# Patient Record
Sex: FEMALE | Race: WHITE | Employment: FULL TIME | URBAN - METROPOLITAN AREA
[De-identification: names, ages, dates, MRNs, and addresses within clinical notes are randomized per-mention and may not be internally consistent; named-entity substitution may affect disease eponyms.]

---

## 2019-04-12 PROBLEM — Z34.00 SUPERVISION OF NORMAL FIRST PREGNANCY: Status: ACTIVE | Noted: 2019-04-12

## 2019-04-12 PROCEDURE — 87389 HIV-1 AG W/HIV-1&-2 AB AG IA: CPT | Performed by: OBSTETRICS & GYNECOLOGY

## 2019-04-12 PROCEDURE — 86850 RBC ANTIBODY SCREEN: CPT | Performed by: OBSTETRICS & GYNECOLOGY

## 2019-04-12 PROCEDURE — 86901 BLOOD TYPING SEROLOGIC RH(D): CPT | Performed by: OBSTETRICS & GYNECOLOGY

## 2019-04-12 PROCEDURE — 86900 BLOOD TYPING SEROLOGIC ABO: CPT | Performed by: OBSTETRICS & GYNECOLOGY

## 2019-04-12 PROCEDURE — 87086 URINE CULTURE/COLONY COUNT: CPT | Performed by: OBSTETRICS & GYNECOLOGY

## 2019-07-15 ENCOUNTER — OFFICE VISIT (OUTPATIENT)
Dept: PERINATAL CARE | Facility: HOSPITAL | Age: 29
End: 2019-07-15
Attending: OBSTETRICS & GYNECOLOGY
Payer: COMMERCIAL

## 2019-07-15 VITALS
BODY MASS INDEX: 31.41 KG/M2 | WEIGHT: 184 LBS | HEART RATE: 80 BPM | HEIGHT: 64 IN | DIASTOLIC BLOOD PRESSURE: 86 MMHG | SYSTOLIC BLOOD PRESSURE: 122 MMHG

## 2019-07-15 DIAGNOSIS — IMO0001 CHOROID PLEXUS CYST OF FETUS, SINGLE OR UNSPECIFIED FETUS: ICD-10-CM

## 2019-07-15 PROCEDURE — 99243 OFF/OP CNSLTJ NEW/EST LOW 30: CPT | Performed by: OBSTETRICS & GYNECOLOGY

## 2019-07-15 PROCEDURE — 76811 OB US DETAILED SNGL FETUS: CPT | Performed by: OBSTETRICS & GYNECOLOGY

## 2019-07-15 NOTE — PROGRESS NOTES
History: Age: 33 years.  : 1 Para: 0.  ____________________________________________________________________________  Dating:  LMP: 2019 EDC: 2019 GA by LMP:   Current Scan on: 07/15/2019 EDC: 2019 GA by current scan:   T mm.  ____________________________________________________________________________  Comments:    Dear Dr. Luke Brock,       Thank you for requesting  an ultrasound and consultation for   Nichelle Junior  regarding choroid plexus cyst. Her prenatal records and of structural abnormalities are seen   4.  choroid plexus cyst- resolved    Recommendations:    1.  continue routine care             Thank you for allowing me to participate in the care of your patient.   Please do not hesitate to call with any questions o

## 2019-09-11 PROBLEM — O24.419 GESTATIONAL DIABETES: Status: ACTIVE | Noted: 2019-09-11

## 2019-10-30 PROBLEM — O99.820 GROUP B STREPTOCOCCUS CARRIER, +RV CULTURE, CURRENTLY PREGNANT: Status: ACTIVE | Noted: 2019-10-30

## 2019-11-08 ENCOUNTER — HOSPITAL ENCOUNTER (OUTPATIENT)
Facility: HOSPITAL | Age: 29
Setting detail: OBSERVATION
Discharge: HOME | End: 2019-11-08
Attending: OBSTETRICS & GYNECOLOGY | Admitting: OBSTETRICS & GYNECOLOGY
Payer: COMMERCIAL

## 2019-11-08 VITALS
SYSTOLIC BLOOD PRESSURE: 121 MMHG | BODY MASS INDEX: 33.46 KG/M2 | OXYGEN SATURATION: 96 % | HEIGHT: 64 IN | DIASTOLIC BLOOD PRESSURE: 78 MMHG | RESPIRATION RATE: 17 BRPM | TEMPERATURE: 98 F | WEIGHT: 196 LBS | HEART RATE: 73 BPM

## 2019-11-08 PROBLEM — Z34.90 PREGNANCY: Status: ACTIVE | Noted: 2019-11-08

## 2019-11-08 PROCEDURE — 84156 ASSAY OF PROTEIN URINE: CPT | Performed by: OBSTETRICS & GYNECOLOGY

## 2019-11-08 PROCEDURE — 99213 OFFICE O/P EST LOW 20 MIN: CPT

## 2019-11-08 PROCEDURE — 80053 COMPREHEN METABOLIC PANEL: CPT | Performed by: OBSTETRICS & GYNECOLOGY

## 2019-11-08 PROCEDURE — 85025 COMPLETE CBC W/AUTO DIFF WBC: CPT | Performed by: OBSTETRICS & GYNECOLOGY

## 2019-11-08 PROCEDURE — 82570 ASSAY OF URINE CREATININE: CPT | Performed by: OBSTETRICS & GYNECOLOGY

## 2019-11-08 PROCEDURE — 84550 ASSAY OF BLOOD/URIC ACID: CPT | Performed by: OBSTETRICS & GYNECOLOGY

## 2019-11-08 PROCEDURE — 36415 COLL VENOUS BLD VENIPUNCTURE: CPT

## 2019-11-08 PROCEDURE — 59025 FETAL NON-STRESS TEST: CPT

## 2019-11-08 NOTE — NST
Nonstress Test   Patient: Carmen Molina    Gestation: 38w1d    NST:       Variability: Moderate           Accelerations: Yes           Decelerations: None            Baseline: 135 BPM           Uterine Irritability: Yes           Contractions: Not pres

## 2019-11-08 NOTE — PROGRESS NOTES
Patient is a  @38.1 weeks sent from office for [facility] evaluation. Patient taken to triage room 3, labs completed, admission assessments done, monitoring initiated.

## 2019-11-08 NOTE — PROGRESS NOTES
Patient discharged home with appointment to follow up on Augusto 11/10 for follow up labs and blood pressure check. Patient left ambulatory with spouse in stable condition.

## 2019-11-08 NOTE — PROGRESS NOTES
d Hospital  Labor and Delivery Prenatal History and Physical Interval Addendum/Triage assessment  Please see full Prenatal Record for this pregnancy      SUBJECTIVE:    Interval History:      This is a pregnancy at 38 weeks who presents to Labor and Deliver

## 2019-11-10 ENCOUNTER — APPOINTMENT (OUTPATIENT)
Dept: OBGYN CLINIC | Facility: HOSPITAL | Age: 29
End: 2019-11-10
Payer: COMMERCIAL

## 2019-11-10 ENCOUNTER — HOSPITAL ENCOUNTER (INPATIENT)
Facility: HOSPITAL | Age: 29
LOS: 3 days | Discharge: HOME | End: 2019-11-13
Attending: OBSTETRICS & GYNECOLOGY | Admitting: OBSTETRICS & GYNECOLOGY
Payer: COMMERCIAL

## 2019-11-10 ENCOUNTER — ANESTHESIA (OUTPATIENT)
Dept: OBGYN CLINIC | Facility: HOSPITAL | Age: 29
End: 2019-11-10
Payer: COMMERCIAL

## 2019-11-10 PROCEDURE — 81002 URINALYSIS NONAUTO W/O SCOPE: CPT

## 2019-11-10 PROCEDURE — 86901 BLOOD TYPING SEROLOGIC RH(D): CPT | Performed by: OBSTETRICS & GYNECOLOGY

## 2019-11-10 PROCEDURE — 85025 COMPLETE CBC W/AUTO DIFF WBC: CPT | Performed by: OBSTETRICS & GYNECOLOGY

## 2019-11-10 PROCEDURE — 86900 BLOOD TYPING SEROLOGIC ABO: CPT | Performed by: OBSTETRICS & GYNECOLOGY

## 2019-11-10 PROCEDURE — 80053 COMPREHEN METABOLIC PANEL: CPT | Performed by: OBSTETRICS & GYNECOLOGY

## 2019-11-10 PROCEDURE — 86850 RBC ANTIBODY SCREEN: CPT | Performed by: OBSTETRICS & GYNECOLOGY

## 2019-11-10 PROCEDURE — 99214 OFFICE O/P EST MOD 30 MIN: CPT

## 2019-11-10 PROCEDURE — 84550 ASSAY OF BLOOD/URIC ACID: CPT | Performed by: OBSTETRICS & GYNECOLOGY

## 2019-11-10 PROCEDURE — 86780 TREPONEMA PALLIDUM: CPT | Performed by: OBSTETRICS & GYNECOLOGY

## 2019-11-10 RX ORDER — ZOLPIDEM TARTRATE 5 MG/1
5 TABLET ORAL NIGHTLY PRN
Status: DISCONTINUED | OUTPATIENT
Start: 2019-11-10 | End: 2019-11-11 | Stop reason: HOSPADM

## 2019-11-10 RX ORDER — IBUPROFEN 600 MG/1
600 TABLET ORAL ONCE AS NEEDED
Status: DISCONTINUED | OUTPATIENT
Start: 2019-11-10 | End: 2019-11-11 | Stop reason: HOSPADM

## 2019-11-10 RX ORDER — TERBUTALINE SULFATE 1 MG/ML
0.25 INJECTION, SOLUTION SUBCUTANEOUS AS NEEDED
Status: DISCONTINUED | OUTPATIENT
Start: 2019-11-10 | End: 2019-11-11 | Stop reason: HOSPADM

## 2019-11-10 RX ORDER — TRISODIUM CITRATE DIHYDRATE AND CITRIC ACID MONOHYDRATE 500; 334 MG/5ML; MG/5ML
30 SOLUTION ORAL AS NEEDED
Status: DISCONTINUED | OUTPATIENT
Start: 2019-11-10 | End: 2019-11-11 | Stop reason: HOSPADM

## 2019-11-10 RX ORDER — DEXTROSE, SODIUM CHLORIDE, SODIUM LACTATE, POTASSIUM CHLORIDE, AND CALCIUM CHLORIDE 5; .6; .31; .03; .02 G/100ML; G/100ML; G/100ML; G/100ML; G/100ML
INJECTION, SOLUTION INTRAVENOUS AS NEEDED
Status: DISCONTINUED | OUTPATIENT
Start: 2019-11-10 | End: 2019-11-11 | Stop reason: HOSPADM

## 2019-11-10 RX ORDER — SODIUM CHLORIDE, SODIUM LACTATE, POTASSIUM CHLORIDE, CALCIUM CHLORIDE 600; 310; 30; 20 MG/100ML; MG/100ML; MG/100ML; MG/100ML
INJECTION, SOLUTION INTRAVENOUS CONTINUOUS
Status: DISCONTINUED | OUTPATIENT
Start: 2019-11-10 | End: 2019-11-11 | Stop reason: HOSPADM

## 2019-11-10 NOTE — PROGRESS NOTES
Pt is a 26year old female admitted to TRG2/TRG2-A, Patient presents with:   Assessment: Scheduled for NST, blood pressure checks and PIH labwork     Pt is 38w3d intra-uterine pregnancy. Denies any leaking of fluid. Reports +fetal movement.    Hist

## 2019-11-11 ENCOUNTER — ANESTHESIA EVENT (OUTPATIENT)
Dept: OBGYN CLINIC | Facility: HOSPITAL | Age: 29
End: 2019-11-11
Payer: COMMERCIAL

## 2019-11-11 PROCEDURE — 82962 GLUCOSE BLOOD TEST: CPT

## 2019-11-11 PROCEDURE — 3E033VJ INTRODUCTION OF OTHER HORMONE INTO PERIPHERAL VEIN, PERCUTANEOUS APPROACH: ICD-10-PCS | Performed by: OBSTETRICS & GYNECOLOGY

## 2019-11-11 PROCEDURE — 0KQM0ZZ REPAIR PERINEUM MUSCLE, OPEN APPROACH: ICD-10-PCS | Performed by: OBSTETRICS & GYNECOLOGY

## 2019-11-11 PROCEDURE — 3E0P7VZ INTRODUCTION OF HORMONE INTO FEMALE REPRODUCTIVE, VIA NATURAL OR ARTIFICIAL OPENING: ICD-10-PCS | Performed by: OBSTETRICS & GYNECOLOGY

## 2019-11-11 RX ORDER — EPHEDRINE SULFATE/0.9% NACL/PF 25 MG/5 ML
5 SYRINGE (ML) INTRAVENOUS AS NEEDED
Status: DISCONTINUED | OUTPATIENT
Start: 2019-11-11 | End: 2019-11-13

## 2019-11-11 RX ORDER — IBUPROFEN 600 MG/1
600 TABLET ORAL EVERY 6 HOURS
Status: DISCONTINUED | OUTPATIENT
Start: 2019-11-11 | End: 2019-11-13

## 2019-11-11 RX ORDER — SIMETHICONE 80 MG
80 TABLET,CHEWABLE ORAL 3 TIMES DAILY PRN
Status: DISCONTINUED | OUTPATIENT
Start: 2019-11-11 | End: 2019-11-13

## 2019-11-11 RX ORDER — BISACODYL 10 MG
10 SUPPOSITORY, RECTAL RECTAL ONCE AS NEEDED
Status: DISCONTINUED | OUTPATIENT
Start: 2019-11-11 | End: 2019-11-13

## 2019-11-11 RX ORDER — NALBUPHINE HCL 10 MG/ML
2.5 AMPUL (ML) INJECTION
Status: DISCONTINUED | OUTPATIENT
Start: 2019-11-11 | End: 2019-11-13

## 2019-11-11 RX ORDER — DOCUSATE SODIUM 100 MG/1
100 CAPSULE, LIQUID FILLED ORAL
Status: DISCONTINUED | OUTPATIENT
Start: 2019-11-11 | End: 2019-11-13

## 2019-11-11 RX ORDER — FAMOTIDINE 20 MG/1
20 TABLET ORAL 2 TIMES DAILY
Status: DISCONTINUED | OUTPATIENT
Start: 2019-11-11 | End: 2019-11-13

## 2019-11-11 RX ORDER — LIDOCAINE HYDROCHLORIDE AND EPINEPHRINE 15; 5 MG/ML; UG/ML
INJECTION, SOLUTION EPIDURAL AS NEEDED
OUTPATIENT
Start: 2019-11-11

## 2019-11-11 RX ORDER — FAMOTIDINE 20 MG/1
20 TABLET ORAL 2 TIMES DAILY
Status: DISCONTINUED | OUTPATIENT
Start: 2019-11-11 | End: 2019-11-11

## 2019-11-11 RX ORDER — ACETAMINOPHEN 325 MG/1
650 TABLET ORAL EVERY 6 HOURS PRN
Status: DISCONTINUED | OUTPATIENT
Start: 2019-11-11 | End: 2019-11-13

## 2019-11-11 RX ADMIN — LIDOCAINE HYDROCHLORIDE AND EPINEPHRINE 2 ML: 15; 5 INJECTION, SOLUTION EPIDURAL at 06:07:00

## 2019-11-11 RX ADMIN — LIDOCAINE HYDROCHLORIDE AND EPINEPHRINE 3 ML: 15; 5 INJECTION, SOLUTION EPIDURAL at 06:06:00

## 2019-11-11 NOTE — PLAN OF CARE
Problem: PAIN - ADULT  Goal: Verbalizes/displays adequate comfort level or patient's stated pain goal  Description  INTERVENTIONS:  - Encourage pt to monitor pain and request assistance  - Assess pain using appropriate pain scale  - Administer analgesics provide assistance as needed. - Assess and monitor emotional status and provide social service/psych resources as needed. - Utilize standard precautions and use personal protective equipment as indicated.  Ensure aseptic care of all intravenous lines and bonding  Description  INTERVENTIONS:  - Assess caregiver- interactions. - Assess caregiver's emotional status and coping mechanisms. - Encourage caregiver to participate in  daily care. - Assess support systems available to mother/family.

## 2019-11-11 NOTE — L&D DELIVERY NOTE
Sd Phillips, Girl Fredis Ramírez [[id number]]    Labor Events     labor?:  No   steroids?:  None  Cervical ripening date/time:  11/10/2019 1900  Cervical ripening type:  Cervidil  Antibiotics received during labor?:  Yes  Antibiotics (enter # doses i pathology     Apgars    Living status:  Living   Apgar Scoring Key:     0 1 2    Skin color Blue or pale Acrocyanotic Completely pink    Heart rate Absent <100 bpm >100 bpm    Reflex irritability No response Grimace Cry or active withdrawal    Muscle tone There was delivery of the anterior shoulder and the remainder of the body without complication. The infant was placed on the mothers chest. The cord was clamped and cut by the FOB after 30 + second delay. There was spontaneous delivery of an intact 3V cord

## 2019-11-11 NOTE — PROGRESS NOTES
[facility]      Labor Progress Note    Jayro Carpenter Patient Status:  Inpatient    1990 MRN [id number]   Location [address] [facility] Attending Eller Collet, MD   Hosp Day # 1 PCP No primary care provider on file.       Elmer Roche

## 2019-11-11 NOTE — ANESTHESIA PREPROCEDURE EVALUATION
PRE-OP EVALUATION    Patient Name: Lilia Senior    Pre-op Diagnosis: IUP with labor pain    Labor epidural    Pre-op vitals reviewed. Temp: 97.8 °F (36.6 °C)  Pulse: 99  Resp: 16  BP: 104/77     Body mass index is 30.88 kg/m².     Current medication Patient has no known allergies. Anesthesia Evaluation    Patient summary reviewed. Anesthetic Complications  (-) history of anesthetic complications         GI/Hepatic/Renal    Negative GI/hepatic/renal ROS.                              Julieth Valentine

## 2019-11-11 NOTE — PLAN OF CARE
Problem: Patient/Family Goals  Goal: Patient/Family Long Term Goal  Description  Patient's Long Term Goal: Uncomplicated vaginal delivery    Interventions:  -   - See additional Care Plan goals for specific interventions   Outcome: Progressing  Goal: Flora Rushing Assess pt frequently for physical needs  - Identify cognitive and physical deficits and behaviors that affect risk of falls.   - [place] fall precautions as indicated by assessment.  - Educate pt/family on patient safety including physical limitations  -

## 2019-11-11 NOTE — H&P
[address] Patient Status:  Inpatient    1990 MRN [id number]   Location [facility] Attending Mikala Bernal MD   Hosp Day # 1 PCP No primary care provider on file.      SUBJECTIVE: at Unknown time  Glucose Blood (ONETOUCH VERIO) In Vitro Strip, Check BG 4 times daily, Disp: 150 strip, Rfl: 0, 11/8/2019 at Unknown time  [facility] LANCETS C Does not apply Misc, Check BG 4 times daily, Disp: 150 each, Rfl: 6, 11/8/2019 at Unknow Absolute 11/10/2019 0.05    • Basophil Absolute 11/10/2019 0.02    • Immature Granulocyte Abs* 11/10/2019 0.03    • Neutrophil % 11/10/2019 62.4    • Lymphocyte % 11/10/2019 29.0    • Monocyte % 11/10/2019 7.0    • Eosinophil % 11/10/2019 0.8    • Basophil

## 2019-11-11 NOTE — PLAN OF CARE
Problem: Patient/Family Goals  Goal: Patient/Family Long Term Goal  Description  Patient's Long Term Goal: Uncomplicated vaginal delivery    Interventions:  -   - See additional Care Plan goals for specific interventions   Outcome: Progressing  Goal: Nolan Simeon

## 2019-11-11 NOTE — PROGRESS NOTES
NURSING ADMISSION NOTE      Patient admitted via Wheelchair  Oriented to room. Safety precautions initiated. Bed in low position. Call light in reach. Report received from [address]. ID bands checked by 2 RN's.

## 2019-11-11 NOTE — ANESTHESIA PROCEDURE NOTES
Labor Analgesia  Performed by: Lydia Rivas MD  Authorized by: Lydia Rivas MD       General Information and Staff    Start Time:  11/11/2019 5:55 AM  End Time:  11/11/2019 6:06 AM  Anesthesiologist:  Lydia Rivas MD  Performed by:   Edna

## 2019-11-11 NOTE — PROGRESS NOTES
Patient up to bathroom with assist x 2. Voided 150. Patient transferred to mother/baby room 1108 per wheelchair in stable condition with baby and personal belongings. Accompanied by significant other and staff. Report given to [facility].

## 2019-11-12 PROCEDURE — 85025 COMPLETE CBC W/AUTO DIFF WBC: CPT | Performed by: OBSTETRICS & GYNECOLOGY

## 2019-11-12 NOTE — PAYOR COMM NOTE
--------------  ADMISSION REVIEW     Payor: [employer]  Subscriber #:  [id number]  Authorization Number: [id number]    Admit date: 11/10/19  Admit time: 80       Admitting Physician: Mic Do MD  Attending Physician:  Mic Do MD  Primary Car • Gestational diabetes     diet controlled     Past Social History:   Past Surgical History:   Procedure Laterality Date   • OTHER      Jaw realignment surgery      Family History: No family history on file.   Social History: Social History    Tobacco Use • AST 11/10/2019 24    • ALT 11/10/2019 28    • Alkaline Phosphatase 11/10/2019 128*   • Bilirubin, Total 11/10/2019 0.3    • Total Protein 11/10/2019 6.2*   • Albumin 11/10/2019 2.5*   • Globulin  11/10/2019 3.7    • A/G Ratio 11/10/2019 0.7*   • FASTING Induction of labor with cervidil followed by pitocin per protocol            Electronically signed by Yanci Alfred MD on 11/11/2019  9:52 AM       Hiren Khan MD   Physician   OB/Gyn   L&D Delivery Note   Signed   Date of Service:  11/11/201 # of loops:  1  Timed cord clamping:  Yes  Time in sec:  30  Cord blood disposition:  to lab  Gases sent?:  No      Resuscitation    Method:  PPV                     Measurements           Weight:  3030 g 6 lb 10.9 oz Length:  49.5 cm    Head circum Estimated Blood Loss: 400  Condition: stable     Patient with borderline BP at 38 weeks, elevated Uric acid, she was brought in for IOL. She received Cdil and Pitocin, progressed to complete and pushed for 45 min.  Multiple doses Abx for GBS     Comments: Will await urge to push      Tiffanie Bravo  11/11/2019  9:45 AM              11/12  Karime Thomason MD   Physician   OB/Gyn   Progress Notes   Signed   Date of Service:  11/12/2019  8:35 AM               Signed          Postpartum Day 1     Pt

## 2019-11-12 NOTE — PLAN OF CARE
Problem: PAIN - ADULT  Goal: Verbalizes/displays adequate comfort level or patient's stated pain goal  Description  INTERVENTIONS:  - Encourage pt to monitor pain and request assistance  - Assess pain using appropriate pain scale  - Administer analgesics cultural beliefs/practices regarding lactation, letdown techniques, maternal food preferences.   - Assess mother's knowledge and previous experience with breast feeding.  - Provide information as needed about early infant feeding cues (e.g., rooting, lip sm safety including physical limitations  - Instruct pt to call for assistance with activity based on assessment  - Modify environment to reduce risk of injury  - Provide assistive devices as appropriate  - Consider OT/PT consult to assist with strengthening/

## 2019-11-12 NOTE — PROGRESS NOTES
Postpartum Day 1    Pt without complaints.     Temp: 98.1 °F (36.7 °C)  Pulse: 66  Resp: 18  BP: 129/81  abd  soft, NT, ND, fundus firm below umbilicus  perineum NL lochia  extr  trace edema, no calf tenderness  Recent Labs   Lab 11/12/19  0653   RBC 3.17*

## 2019-11-12 NOTE — PROGRESS NOTES
Labor Analgesia Follow Up Note    Patient underwent epidural anesthesia for labor analgesia,    Placenta Date/Time: 11/11/2019 12:24 PM    Delivery Date/Time[de-identified] 11/11/2019  12:21 PM    /82 (BP Location: Left arm)   Pulse 66   Temp 97.6 °F (36.4 °C) (O

## 2019-11-13 VITALS
HEART RATE: 66 BPM | TEMPERATURE: 98 F | RESPIRATION RATE: 18 BRPM | DIASTOLIC BLOOD PRESSURE: 89 MMHG | WEIGHT: 179.88 LBS | BODY MASS INDEX: 31 KG/M2 | SYSTOLIC BLOOD PRESSURE: 135 MMHG

## 2019-11-13 PROCEDURE — 90471 IMMUNIZATION ADMIN: CPT

## 2019-11-13 NOTE — PROGRESS NOTES
[facility]  Post-Partum Vaginal Delivery Progress Note    Tabatha Solid Patient Status:  Inpatient    1990 MRN [id number]   [facility] 1SW-J Attending Art Landin MD   Hosp Day # 3 PCP No primary care provider on file.

## 2019-11-14 PROBLEM — Z34.00 SUPERVISION OF NORMAL FIRST PREGNANCY: Status: RESOLVED | Noted: 2019-04-12 | Resolved: 2019-11-11

## 2019-11-14 PROBLEM — O99.820 GROUP B STREPTOCOCCUS CARRIER, +RV CULTURE, CURRENTLY PREGNANT: Status: RESOLVED | Noted: 2019-10-30 | Resolved: 2019-11-11

## 2019-11-15 ENCOUNTER — TELEPHONE (OUTPATIENT)
Dept: OBGYN UNIT | Facility: HOSPITAL | Age: 29
End: 2019-11-15

## 2019-11-15 NOTE — PROGRESS NOTES
Reviewed self and infant care w / mom, she verbalizes understanding of instructions reviewed. Encourage to follow up w/ MDs as directed and w/ questions/concerns. PIH reviewed, enc to go to ct and br help.

## 2019-11-18 ENCOUNTER — NURSE ONLY (OUTPATIENT)
Dept: LACTATION | Facility: HOSPITAL | Age: 29
End: 2019-11-18
Attending: OBSTETRICS & GYNECOLOGY
Payer: COMMERCIAL

## 2019-11-18 PROCEDURE — 99213 OFFICE O/P EST LOW 20 MIN: CPT

## 2019-11-19 NOTE — PROGRESS NOTES
LACTATION NOTE - MOTHER      Evaluation Type: Outpatient Initial(lactation visit for latch assistance, infant gaining weight with bottle feeding EBM/formula)    Problems identified  Problems identified: Milk supply not WNL; Knowledge deficit; Unable to achei

## 2019-11-21 ENCOUNTER — NURSE ONLY (OUTPATIENT)
Dept: LACTATION | Facility: HOSPITAL | Age: 29
End: 2019-11-21
Attending: OBSTETRICS & GYNECOLOGY
Payer: COMMERCIAL

## 2019-11-21 PROCEDURE — 99212 OFFICE O/P EST SF 10 MIN: CPT

## 2019-11-21 NOTE — PROGRESS NOTES
LACTATION NOTE - MOTHER      Evaluation Type: Outpatient Follow Up    Problems identified  Problems identified: Knowledge deficit;Milk supply not WNL; Unable to acheive sustained latch  Milk supply not WNL: Hypogalactia  Problems Identified Other: EBM used

## 2019-12-05 ENCOUNTER — NURSE ONLY (OUTPATIENT)
Dept: LACTATION | Facility: HOSPITAL | Age: 29
End: 2019-12-05
Attending: OBSTETRICS & GYNECOLOGY
Payer: COMMERCIAL

## 2019-12-05 PROCEDURE — 99212 OFFICE O/P EST SF 10 MIN: CPT

## 2019-12-05 NOTE — PROGRESS NOTES
LACTATION NOTE - MOTHER      Evaluation Type: Outpatient Follow Up    Problems identified  Problems identified: Knowledge deficit;Milk supply WNL  Milk supply not WNL: Reduced (potential)  Problems Identified Other: EBM used during daytime feeding, formula

## 2020-03-19 ENCOUNTER — OCC HEALTH (OUTPATIENT)
Dept: OCCUPATIONAL MEDICINE | Age: 30
End: 2020-03-19
Attending: FAMILY MEDICINE

## 2020-08-28 PROBLEM — Z34.90 PREGNANCY: Status: RESOLVED | Noted: 2019-11-08 | Resolved: 2020-08-28

## 2020-09-22 PROBLEM — Z34.81 PRENATAL CARE, SUBSEQUENT PREGNANCY, FIRST TRIMESTER: Status: ACTIVE | Noted: 2020-09-22

## 2020-09-22 PROBLEM — Z86.32 HISTORY OF GESTATIONAL DIABETES: Status: ACTIVE | Noted: 2020-09-22

## 2020-09-22 PROBLEM — O24.419 GESTATIONAL DIABETES: Status: RESOLVED | Noted: 2019-09-11 | Resolved: 2020-09-22

## 2020-11-20 PROBLEM — Z34.80 SUPERVISION OF OTHER NORMAL PREGNANCY, ANTEPARTUM: Status: ACTIVE | Noted: 2020-11-20

## 2021-02-16 PROBLEM — O09.299 HX OF PREECLAMPSIA, PRIOR PREGNANCY, CURRENTLY PREGNANT: Status: ACTIVE | Noted: 2021-02-16

## 2021-03-18 PROBLEM — O99.820 GBS (GROUP B STREPTOCOCCUS CARRIER), +RV CULTURE, CURRENTLY PREGNANT: Status: ACTIVE | Noted: 2021-03-18

## 2021-04-05 PROBLEM — Z34.81 PRENATAL CARE, SUBSEQUENT PREGNANCY, FIRST TRIMESTER: Status: RESOLVED | Noted: 2020-09-22 | Resolved: 2021-04-05

## 2021-04-10 ENCOUNTER — HOSPITAL ENCOUNTER (INPATIENT)
Facility: HOSPITAL | Age: 31
LOS: 3 days | Discharge: HOME | End: 2021-04-13
Attending: OBSTETRICS & GYNECOLOGY | Admitting: OBSTETRICS & GYNECOLOGY
Payer: COMMERCIAL

## 2021-04-10 PROBLEM — Z34.90 PREGNANCY: Status: ACTIVE | Noted: 2021-04-10

## 2021-04-10 PROCEDURE — 99214 OFFICE O/P EST MOD 30 MIN: CPT

## 2021-04-10 PROCEDURE — 86780 TREPONEMA PALLIDUM: CPT | Performed by: OBSTETRICS & GYNECOLOGY

## 2021-04-10 PROCEDURE — 86850 RBC ANTIBODY SCREEN: CPT | Performed by: OBSTETRICS & GYNECOLOGY

## 2021-04-10 PROCEDURE — 85025 COMPLETE CBC W/AUTO DIFF WBC: CPT | Performed by: OBSTETRICS & GYNECOLOGY

## 2021-04-10 PROCEDURE — 86901 BLOOD TYPING SEROLOGIC RH(D): CPT | Performed by: OBSTETRICS & GYNECOLOGY

## 2021-04-10 PROCEDURE — 3E033VJ INTRODUCTION OF OTHER HORMONE INTO PERIPHERAL VEIN, PERCUTANEOUS APPROACH: ICD-10-PCS | Performed by: OBSTETRICS & GYNECOLOGY

## 2021-04-10 PROCEDURE — 86900 BLOOD TYPING SEROLOGIC ABO: CPT | Performed by: OBSTETRICS & GYNECOLOGY

## 2021-04-10 RX ORDER — CALCIUM CARBONATE 200(500)MG
500 TABLET,CHEWABLE ORAL 3 TIMES DAILY PRN
Status: DISCONTINUED | OUTPATIENT
Start: 2021-04-10 | End: 2021-04-11

## 2021-04-10 RX ORDER — TRISODIUM CITRATE DIHYDRATE AND CITRIC ACID MONOHYDRATE 500; 334 MG/5ML; MG/5ML
30 SOLUTION ORAL AS NEEDED
Status: DISCONTINUED | OUTPATIENT
Start: 2021-04-10 | End: 2021-04-11 | Stop reason: HOSPADM

## 2021-04-10 RX ORDER — BUPIVACAINE HCL/0.9 % NACL/PF 0.25 %
5 PLASTIC BAG, INJECTION (ML) EPIDURAL AS NEEDED
Status: DISCONTINUED | OUTPATIENT
Start: 2021-04-10 | End: 2021-04-11

## 2021-04-10 RX ORDER — ONDANSETRON 2 MG/ML
4 INJECTION INTRAMUSCULAR; INTRAVENOUS EVERY 6 HOURS PRN
Status: DISCONTINUED | OUTPATIENT
Start: 2021-04-10 | End: 2021-04-11 | Stop reason: HOSPADM

## 2021-04-10 RX ORDER — TERBUTALINE SULFATE 1 MG/ML
0.25 INJECTION, SOLUTION SUBCUTANEOUS AS NEEDED
Status: DISCONTINUED | OUTPATIENT
Start: 2021-04-10 | End: 2021-04-11 | Stop reason: HOSPADM

## 2021-04-10 RX ORDER — IBUPROFEN 600 MG/1
600 TABLET ORAL EVERY 6 HOURS PRN
Status: DISCONTINUED | OUTPATIENT
Start: 2021-04-10 | End: 2021-04-11 | Stop reason: HOSPADM

## 2021-04-10 RX ORDER — AMMONIA INHALANTS 0.04 G/.3ML
0.3 INHALANT RESPIRATORY (INHALATION) AS NEEDED
Status: DISCONTINUED | OUTPATIENT
Start: 2021-04-10 | End: 2021-04-11 | Stop reason: HOSPADM

## 2021-04-10 RX ORDER — DEXTROSE, SODIUM CHLORIDE, SODIUM LACTATE, POTASSIUM CHLORIDE, AND CALCIUM CHLORIDE 5; .6; .31; .03; .02 G/100ML; G/100ML; G/100ML; G/100ML; G/100ML
INJECTION, SOLUTION INTRAVENOUS AS NEEDED
Status: DISCONTINUED | OUTPATIENT
Start: 2021-04-10 | End: 2021-04-11 | Stop reason: HOSPADM

## 2021-04-10 RX ORDER — SODIUM CHLORIDE, SODIUM LACTATE, POTASSIUM CHLORIDE, CALCIUM CHLORIDE 600; 310; 30; 20 MG/100ML; MG/100ML; MG/100ML; MG/100ML
INJECTION, SOLUTION INTRAVENOUS CONTINUOUS
Status: DISCONTINUED | OUTPATIENT
Start: 2021-04-10 | End: 2021-04-11 | Stop reason: HOSPADM

## 2021-04-10 RX ORDER — ACETAMINOPHEN 500 MG
500 TABLET ORAL EVERY 6 HOURS PRN
Status: DISCONTINUED | OUTPATIENT
Start: 2021-04-10 | End: 2021-04-11 | Stop reason: HOSPADM

## 2021-04-10 RX ORDER — NALBUPHINE HCL 10 MG/ML
2.5 AMPUL (ML) INJECTION
Status: DISCONTINUED | OUTPATIENT
Start: 2021-04-10 | End: 2021-04-11

## 2021-04-11 ENCOUNTER — ANESTHESIA (OUTPATIENT)
Dept: OBGYN UNIT | Facility: HOSPITAL | Age: 31
End: 2021-04-11
Payer: COMMERCIAL

## 2021-04-11 ENCOUNTER — ANESTHESIA EVENT (OUTPATIENT)
Dept: OBGYN UNIT | Facility: HOSPITAL | Age: 31
End: 2021-04-11
Payer: COMMERCIAL

## 2021-04-11 PROCEDURE — 0HQ9XZZ REPAIR PERINEUM SKIN, EXTERNAL APPROACH: ICD-10-PCS | Performed by: OBSTETRICS & GYNECOLOGY

## 2021-04-11 PROCEDURE — 10907ZC DRAINAGE OF AMNIOTIC FLUID, THERAPEUTIC FROM PRODUCTS OF CONCEPTION, VIA NATURAL OR ARTIFICIAL OPENING: ICD-10-PCS | Performed by: OBSTETRICS & GYNECOLOGY

## 2021-04-11 RX ORDER — SIMETHICONE 80 MG
80 TABLET,CHEWABLE ORAL 3 TIMES DAILY PRN
Status: DISCONTINUED | OUTPATIENT
Start: 2021-04-11 | End: 2021-04-13

## 2021-04-11 RX ORDER — IBUPROFEN 600 MG/1
600 TABLET ORAL EVERY 6 HOURS
Status: DISCONTINUED | OUTPATIENT
Start: 2021-04-11 | End: 2021-04-13

## 2021-04-11 RX ORDER — DOCUSATE SODIUM 100 MG/1
100 CAPSULE, LIQUID FILLED ORAL
Status: DISCONTINUED | OUTPATIENT
Start: 2021-04-11 | End: 2021-04-13

## 2021-04-11 RX ORDER — CALCIUM CARBONATE 200(500)MG
1000 TABLET,CHEWABLE ORAL 3 TIMES DAILY PRN
Status: DISCONTINUED | OUTPATIENT
Start: 2021-04-11 | End: 2021-04-11

## 2021-04-11 RX ORDER — ACETAMINOPHEN 325 MG/1
650 TABLET ORAL EVERY 6 HOURS PRN
Status: DISCONTINUED | OUTPATIENT
Start: 2021-04-11 | End: 2021-04-13

## 2021-04-11 RX ORDER — BISACODYL 10 MG
10 SUPPOSITORY, RECTAL RECTAL ONCE AS NEEDED
Status: DISCONTINUED | OUTPATIENT
Start: 2021-04-11 | End: 2021-04-13

## 2021-04-11 NOTE — ANESTHESIA PREPROCEDURE EVALUATION
PRE-OP EVALUATION    Patient Name: Jordyn See    Admit Diagnosis: preg state  Pregnancy    Pre-op Diagnosis: * No pre-op diagnosis entered *        Anesthesia Procedure: LABOR ANALGESIA    * No surgeons found in log *    Pre-op vitals reviewed.   [facility] 0.15% epidural infusion, , , PRN  fentaNYL citrate (SUBLIMAZE) 0.05 MG/ML injection, , Epidural, PRN        Outpatient Medications:   Docusate Sodium (COLACE OR), Take by mouth., Disp: , Rfl: , 4/9/2021 at Unknown time  Famotidine (PEPCID OR), Inject into 95.2 04/10/2021    MCH 32.8 04/10/2021    MCHC 34.5 04/10/2021    RDW 13.5 04/10/2021    .0 04/10/2021               Airway      Mallampati: II  Mouth opening: >3 FB  TM distance: > 6 cm  Neck ROM: full Cardiovascular    Cardiovascular exam normal.

## 2021-04-11 NOTE — ANESTHESIA PROCEDURE NOTES
Labor Analgesia  Performed by: Kavin Chavez MD  Authorized by: Kavin Chavez MD       General Information and Staff    Start Time:   Anesthesiologist: Kavin Chavez MD  Performed by:   Anesthesiologist  Patient Location:  OB  Reason for Block: labor

## 2021-04-11 NOTE — H&P
[address] and Delivery Prenatal History and Physical Interval Addendum  Please see full Prenatal Record for this pregnancy      SUBJECTIVE:    Interval History: This is a 26yo  at 40w0d admitted for IOL for decreased fetal movement.

## 2021-04-11 NOTE — PROGRESS NOTES
Pt is a 31year old female admitted to TR/TR-A. Patient presents with:  Decreased Fetal Movement     Pt is  39w6d intra-uterine pregnancy. History obtained, consents signed. Oriented to room, staff, and plan of care.

## 2021-04-11 NOTE — PLAN OF CARE
Problem: BIRTH - VAGINAL/ SECTION  Goal: Fetal and maternal status remain reassuring during the birth process  Description: INTERVENTIONS:  - Monitor vital signs  - Monitor fetal heart rate  - Monitor uterine activity  - Monitor labor progression Short Term Goal  Description: Patient's Short Term Goal: Adequate pain management.     Interventions:   - Proceed with POC.  - See additional Care Plan goals for specific interventions  Outcome: Progressing

## 2021-04-11 NOTE — L&D DELIVERY NOTE
Guru Cruz, Girl [[id number]]    Labor Events     labor?: No   steroids?: None  Antibiotics received during labor?: Yes  Antibiotics (enter # doses in comment): ampicillin  Rupture date/time: 2021     Rupture type: AROM  Fluid color: Skin to Skin    No data filed      Vaginal Count    Initial count RN: Ebonie Clarke RN  Initial count Tech: [place], Lucille   Sponges   Sharps    Initial counts 11   0    Final counts           Delivery (Maternal)    No data filed

## 2021-04-12 PROCEDURE — 85025 COMPLETE CBC W/AUTO DIFF WBC: CPT | Performed by: OBSTETRICS & GYNECOLOGY

## 2021-04-12 RX ORDER — CALCIUM CARBONATE 200(500)MG
500 TABLET,CHEWABLE ORAL
Status: DISCONTINUED | OUTPATIENT
Start: 2021-04-12 | End: 2021-04-13

## 2021-04-12 NOTE — PROGRESS NOTES
Labor Analgesia Follow Up Note    Patient underwent epidural anesthesia for labor analgesia,    Placenta Date/Time: 4/11/2021  1:37 PM    Delivery Date/Time[de-identified] 4/11/2021  1:34 PM    BP 98/68 (BP Location: Right arm)   Pulse 82   Temp 97.9 °F (36.6 °C) (Oral

## 2021-04-12 NOTE — PROGRESS NOTES
Postpartum Progress Note    SUBJECTIVE:  Postpartum day #1 s/p     No overnight events. Patient doing well without complaints. Ambulating, tolerating PO, voiding, pain well controlled.       OBJECTIVE:    Vital signs in last 24 hours:  Temp:  [97.9 °F

## 2021-04-13 VITALS
DIASTOLIC BLOOD PRESSURE: 89 MMHG | BODY MASS INDEX: 35.17 KG/M2 | HEART RATE: 77 BPM | WEIGHT: 206 LBS | TEMPERATURE: 98 F | RESPIRATION RATE: 17 BRPM | SYSTOLIC BLOOD PRESSURE: 120 MMHG | HEIGHT: 64 IN

## 2021-04-13 NOTE — PROGRESS NOTES
Discharge patient home as order. Teaching complete, patient feel comfortable to take care herself and  infant. Hugs and kisses off. Sent both mom and infant to their family car @ 56.

## 2021-04-13 NOTE — PROGRESS NOTES
Postpartum Day 2    Pt without complaints. Temp: 97.8 °F (36.6 °C)  Pulse: 80  Resp: 18  BP: 123/88  abd  soft, NT, ND, fundus firm below umbilicus  perineum NL lochia  extr  trace edema, no calf tenderness    Impression: PPD#2  Plan:  Home today.  [employer]

## 2021-04-16 ENCOUNTER — TELEPHONE (OUTPATIENT)
Dept: OBGYN UNIT | Facility: HOSPITAL | Age: 31
End: 2021-04-16

## 2021-04-18 ENCOUNTER — APPOINTMENT (OUTPATIENT)
Dept: ULTRASOUND IMAGING | Age: 31
End: 2021-04-18
Attending: NURSE PRACTITIONER
Payer: COMMERCIAL

## 2021-04-18 ENCOUNTER — HOSPITAL ENCOUNTER (OUTPATIENT)
Age: 31
Discharge: HOME | End: 2021-04-18
Payer: COMMERCIAL

## 2021-04-18 VITALS
SYSTOLIC BLOOD PRESSURE: 132 MMHG | RESPIRATION RATE: 18 BRPM | OXYGEN SATURATION: 97 % | TEMPERATURE: 98 F | BODY MASS INDEX: 35 KG/M2 | DIASTOLIC BLOOD PRESSURE: 81 MMHG | HEART RATE: 88 BPM | HEIGHT: 64 IN

## 2021-04-18 DIAGNOSIS — N93.9 VAGINAL BLEEDING: Primary | ICD-10-CM

## 2021-04-18 PROCEDURE — 99214 OFFICE O/P EST MOD 30 MIN: CPT

## 2021-04-18 PROCEDURE — 80047 BASIC METABLC PNL IONIZED CA: CPT

## 2021-04-18 PROCEDURE — 85025 COMPLETE CBC W/AUTO DIFF WBC: CPT | Performed by: NURSE PRACTITIONER

## 2021-04-18 PROCEDURE — 36415 COLL VENOUS BLD VENIPUNCTURE: CPT

## 2021-04-18 PROCEDURE — 76856 US EXAM PELVIC COMPLETE: CPT | Performed by: NURSE PRACTITIONER

## 2021-04-18 PROCEDURE — 99204 OFFICE O/P NEW MOD 45 MIN: CPT

## 2021-04-18 RX ORDER — AMOXICILLIN AND CLAVULANATE POTASSIUM 500; 125 MG/1; MG/1
1 TABLET, FILM COATED ORAL 3 TIMES DAILY
Qty: 21 TABLET | Refills: 0 | Status: SHIPPED | OUTPATIENT
Start: 2021-04-18 | End: 2021-04-25

## 2021-04-18 NOTE — ED PROVIDER NOTES
Patient Seen in: [facility] [place]      History   Patient presents with:  Abdominal Pain  Vaginal Bleeding    Stated Complaint: retained placenta    HPI/Subjective:   HPI  39-year-old female who is 1 week postpartum presents to immediate care fo distress. Appearance: She is well-developed. She is not ill-appearing or toxic-appearing. Cardiovascular:      Rate and Rhythm: Normal rate and regular rhythm. Heart sounds: Normal heart sounds.    Pulmonary:      Effort: Pulmonary effort is norm nontoxic. Patient without any abdominal tenderness. Abdominal ultrasound performed of the pelvis which shows thickened endometrium which is most likely due to postpartum status. There is no vascularity seen.   These findings were discussed with Dr. Vicki Coker

## 2021-04-18 NOTE — ED INITIAL ASSESSMENT (HPI)
Pt presents today with c/o lower abdominal cramping and vaginal bleeding since yesterday. Pt states that yesterday when she went to the bathroom she noticed a piece of tissue hanging down.  Pt states that she called her OB and he told her to pull the tissue